# Patient Record
Sex: MALE | Race: ASIAN | URBAN - METROPOLITAN AREA
[De-identification: names, ages, dates, MRNs, and addresses within clinical notes are randomized per-mention and may not be internally consistent; named-entity substitution may affect disease eponyms.]

---

## 2020-07-06 ENCOUNTER — OFFICE VISIT (OUTPATIENT)
Dept: URBAN - METROPOLITAN AREA CLINIC 54 | Facility: CLINIC | Age: 30
End: 2020-07-06
Payer: COMMERCIAL

## 2020-07-06 DIAGNOSIS — K92.1 HEMATOCHEZIA: ICD-10-CM

## 2020-07-06 PROCEDURE — G8427 DOCREV CUR MEDS BY ELIG CLIN: HCPCS | Performed by: INTERNAL MEDICINE

## 2020-07-06 PROCEDURE — G9903 PT SCRN TBCO ID AS NON USER: HCPCS | Performed by: INTERNAL MEDICINE

## 2020-07-06 PROCEDURE — 1036F TOBACCO NON-USER: CPT | Performed by: INTERNAL MEDICINE

## 2020-07-06 PROCEDURE — 99202 OFFICE O/P NEW SF 15 MIN: CPT | Performed by: INTERNAL MEDICINE

## 2020-07-06 RX ORDER — POLYETHYLENE GLYCOL 3350, SODIUM SULFATE, SODIUM CHLORIDE, POTASSIUM CHLORIDE, ASCORBIC ACID, SODIUM ASCORBATE 140-9-5.2G
AS DIRECTED KIT ORAL
Qty: 1 | OUTPATIENT
Start: 2020-07-06 | End: 2020-07-07

## 2020-07-06 NOTE — HPI-OTHER HISTORIES
>>06/2020 ER visit 30 yo male presents to the ED after he squatted to  a package outside his door and when he got up noticed there was a moderate amount of blood on the floor where he had squatted. The incident occurred prior to arrival and pt decided to come to the ED. Pt also had an episode of bright red blood after having a bowel movement yesterday. Pt was in route to the ED and started feeling dizzy, had heart racing, and difficulty breathing while he was driving. Pt states his vision went black for a few seconds but was conscious. Pt pulled over and called 911. EMS arrived and checked pts vitals which were stable and they followed him to the ED. Denies fever, cough, chest pain,abdominal pain, n/v/d, and syncope.

## 2020-07-06 NOTE — HPI-TODAY'S VISIT:
This is a 29-year-old Chinese male referred by the [facility].  He has had intermittent episodes of bright red rectal bleeding in the toilet as well as on the tissue.  He has a history of hemorrhoids treated in [place] about 10 years ago.  And he has had had an endoscopic examination at that time of unknown extent.  He denies chronic constipation or diarrhea.  There is no abdominal pain.  He is has not experienced weight loss or other constitutional symptoms.  He does not have a family history of inflammatory bowel disease or GI cancer. Good general health with no cardiac or respiratory problems.  He works as an .

## 2020-07-07 ENCOUNTER — TELEPHONE ENCOUNTER (OUTPATIENT)
Dept: URBAN - METROPOLITAN AREA CLINIC 92 | Facility: CLINIC | Age: 30
End: 2020-07-07

## 2020-07-16 ENCOUNTER — OFFICE VISIT (OUTPATIENT)
Dept: URBAN - METROPOLITAN AREA SURGERY CENTER 14 | Facility: SURGERY CENTER | Age: 30
End: 2020-07-16

## 2020-07-24 ENCOUNTER — TELEPHONE ENCOUNTER (OUTPATIENT)
Dept: URBAN - METROPOLITAN AREA CLINIC 54 | Facility: CLINIC | Age: 30
End: 2020-07-24

## 2020-07-27 ENCOUNTER — TELEPHONE ENCOUNTER (OUTPATIENT)
Dept: URBAN - METROPOLITAN AREA CLINIC 54 | Facility: CLINIC | Age: 30
End: 2020-07-27

## 2020-07-27 ENCOUNTER — OFFICE VISIT (OUTPATIENT)
Dept: URBAN - METROPOLITAN AREA SURGERY CENTER 14 | Facility: SURGERY CENTER | Age: 30
End: 2020-07-27
Payer: COMMERCIAL

## 2020-07-27 DIAGNOSIS — K63.89 BACTERIAL OVERGROWTH SYNDROME: ICD-10-CM

## 2020-07-27 DIAGNOSIS — K92.1 BLACK STOOL: ICD-10-CM

## 2020-07-27 PROCEDURE — 45378 DIAGNOSTIC COLONOSCOPY: CPT | Performed by: INTERNAL MEDICINE

## 2020-07-27 PROCEDURE — G9937 DIG OR SURV COLSCO: HCPCS | Performed by: INTERNAL MEDICINE

## 2020-07-27 PROCEDURE — G8907 PT DOC NO EVENTS ON DISCHARG: HCPCS | Performed by: INTERNAL MEDICINE

## 2023-08-10 ENCOUNTER — OFFICE VISIT (OUTPATIENT)
Dept: URBAN - NONMETROPOLITAN AREA CLINIC 4 | Facility: CLINIC | Age: 33
End: 2023-08-10
Payer: COMMERCIAL

## 2023-08-10 ENCOUNTER — LAB OUTSIDE AN ENCOUNTER (OUTPATIENT)
Dept: URBAN - NONMETROPOLITAN AREA CLINIC 4 | Facility: CLINIC | Age: 33
End: 2023-08-10

## 2023-08-10 ENCOUNTER — WEB ENCOUNTER (OUTPATIENT)
Dept: URBAN - NONMETROPOLITAN AREA CLINIC 4 | Facility: CLINIC | Age: 33
End: 2023-08-10

## 2023-08-10 VITALS
TEMPERATURE: 97.4 F | HEART RATE: 71 BPM | DIASTOLIC BLOOD PRESSURE: 67 MMHG | SYSTOLIC BLOOD PRESSURE: 115 MMHG | HEIGHT: 72 IN | BODY MASS INDEX: 31.56 KG/M2 | WEIGHT: 233 LBS

## 2023-08-10 DIAGNOSIS — K21.9 GERD WITHOUT ESOPHAGITIS: ICD-10-CM

## 2023-08-10 DIAGNOSIS — R10.12 LEFT UPPER QUADRANT ABDOMINAL PAIN: ICD-10-CM

## 2023-08-10 DIAGNOSIS — K64.8 INTERNAL HEMORRHOID, BLEEDING: ICD-10-CM

## 2023-08-10 PROBLEM — 266435005: Status: ACTIVE | Noted: 2023-08-10

## 2023-08-10 PROCEDURE — 99204 OFFICE O/P NEW MOD 45 MIN: CPT | Performed by: INTERNAL MEDICINE

## 2023-08-10 PROCEDURE — 99214 OFFICE O/P EST MOD 30 MIN: CPT | Performed by: INTERNAL MEDICINE

## 2023-08-10 RX ORDER — OMEPRAZOLE 20 MG/1
1 CAPSULE 30 MINUTES BEFORE MORNING MEAL CAPSULE, DELAYED RELEASE ORAL ONCE A DAY
Status: ACTIVE | COMMUNITY

## 2023-08-10 RX ORDER — HYDROCORTISONE 25 MG/G
1 APPLICATION CREAM TOPICAL TWICE A DAY
Qty: 1 KIT | Refills: 0 | OUTPATIENT
Start: 2023-08-10

## 2023-08-10 NOTE — HPI-TODAY'S VISIT:
Pt reports that he has been having heartburn x 2 years onset.  Pt reports that he has been using the omeprazole for the past 1 year with some episodes of holding the medication.   Pt reports that when he stops he will have pain in the left upper quadrant, worse at night. Pt repoerts that despite taking the omeprazole he still has breakthrough symptoms. Has to take tums to get rid of the acid to improve.   Pt reports that he does not have nausea with sx.  Pt reports that he does not have fhx of gi neoplasm. Denies melena. Denies dysphagia.  Pt reports that sweet food- such as donuts- make worse.  Pt reports hat he notes that if he doesnt eat he feels worse and if does then sometimes.  Pt rp that he had upper endo in [place] in 2010 and 2016 - neg.   Had c scope with Dr. Jones for hematochezia which he still experiences periodically  Referred by Dr Gael Hamilton for eval and treatment. A copy will be sent

## 2023-08-17 ENCOUNTER — CLAIMS CREATED FROM THE CLAIM WINDOW (OUTPATIENT)
Dept: URBAN - NONMETROPOLITAN AREA CLINIC 4 | Facility: CLINIC | Age: 33
End: 2023-08-17
Payer: COMMERCIAL

## 2023-08-17 VITALS
HEART RATE: 69 BPM | TEMPERATURE: 97.2 F | WEIGHT: 229 LBS | HEIGHT: 72 IN | SYSTOLIC BLOOD PRESSURE: 114 MMHG | BODY MASS INDEX: 31.02 KG/M2 | DIASTOLIC BLOOD PRESSURE: 75 MMHG

## 2023-08-17 DIAGNOSIS — K64.8 INTERNAL HEMORRHOID, BLEEDING: ICD-10-CM

## 2023-08-17 DIAGNOSIS — K64.1 BLEEDING GRADE II HEMORRHOIDS: ICD-10-CM

## 2023-08-17 DIAGNOSIS — R10.12 LEFT UPPER QUADRANT ABDOMINAL PAIN: ICD-10-CM

## 2023-08-17 PROCEDURE — 25 SEPARATE E/M: Performed by: INTERNAL MEDICINE

## 2023-08-17 PROCEDURE — 46221 LIGATION OF HEMORRHOID(S): CPT | Performed by: INTERNAL MEDICINE

## 2023-08-17 RX ORDER — OMEPRAZOLE 20 MG/1
1 CAPSULE 30 MINUTES BEFORE MORNING MEAL CAPSULE, DELAYED RELEASE ORAL ONCE A DAY
Status: ACTIVE | COMMUNITY

## 2023-08-17 RX ORDER — HYDROCORTISONE 25 MG/G
1 APPLICATION CREAM TOPICAL TWICE A DAY
Qty: 1 KIT | Refills: 0 | Status: ACTIVE | COMMUNITY
Start: 2023-08-10

## 2023-08-17 NOTE — HPI-TODAY'S VISIT:
Pt reports that he has been having heartburn x 2 years onset.  Pt reports that he has been using the omeprazole for the past 1 year with some episodes of holding the medication.   Pt reports that when he stops he will have pain in the left upper quadrant, worse at night. Pt repoerts that despite taking the omeprazole he still has breakthrough symptoms. Has to take tums to get rid of the acid to improve.   Pt reports that he does not have nausea with sx.  Pt reports that he does not have fhx of gi neoplasm. Denies melena. Denies dysphagia.  Pt reports that sweet food- such as donuts- make worse.  Pt reports hat he notes that if he doesnt eat he feels worse and if does then sometimes.  Pt rp that he had upper endo in [place] in 2010 and 2016 - neg.   Had c scope with Dr. Jones for hematochezia which he still experiences periodically  8-17-23 Pt here for hemorrhoidal banding. Itching burning and bleeding from hemorrhoids

## 2023-09-06 ENCOUNTER — OFFICE VISIT (OUTPATIENT)
Dept: URBAN - METROPOLITAN AREA SURGERY CENTER 14 | Facility: SURGERY CENTER | Age: 33
End: 2023-09-06

## 2023-09-18 ENCOUNTER — OFFICE VISIT (OUTPATIENT)
Dept: URBAN - NONMETROPOLITAN AREA CLINIC 4 | Facility: CLINIC | Age: 33
End: 2023-09-18

## 2023-09-26 ENCOUNTER — CLAIMS CREATED FROM THE CLAIM WINDOW (OUTPATIENT)
Dept: URBAN - METROPOLITAN AREA CLINIC 4 | Facility: CLINIC | Age: 33
End: 2023-09-26
Payer: COMMERCIAL

## 2023-09-26 ENCOUNTER — TELEPHONE ENCOUNTER (OUTPATIENT)
Dept: URBAN - METROPOLITAN AREA CLINIC 54 | Facility: CLINIC | Age: 33
End: 2023-09-26

## 2023-09-26 ENCOUNTER — OUT OF OFFICE VISIT (OUTPATIENT)
Dept: URBAN - METROPOLITAN AREA SURGERY CENTER 14 | Facility: SURGERY CENTER | Age: 33
End: 2023-09-26
Payer: COMMERCIAL

## 2023-09-26 ENCOUNTER — OFFICE VISIT (OUTPATIENT)
Dept: URBAN - NONMETROPOLITAN AREA CLINIC 4 | Facility: CLINIC | Age: 33
End: 2023-09-26

## 2023-09-26 DIAGNOSIS — R12 BURNING REFLUX: ICD-10-CM

## 2023-09-26 DIAGNOSIS — K31.A0 GASTRIC INTESTINAL METAPLASIA, UNSPECIFIED: ICD-10-CM

## 2023-09-26 DIAGNOSIS — K29.50 ANTRAL GASTRITIS: ICD-10-CM

## 2023-09-26 DIAGNOSIS — K29.70 ERYTHEMATOUS GASTROPATHY: ICD-10-CM

## 2023-09-26 DIAGNOSIS — K21.9 GERD WITHOUT ESOPHAGITIS: ICD-10-CM

## 2023-09-26 PROCEDURE — 88342 IMHCHEM/IMCYTCHM 1ST ANTB: CPT | Performed by: PATHOLOGY

## 2023-09-26 PROCEDURE — 43239 EGD BIOPSY SINGLE/MULTIPLE: CPT | Performed by: INTERNAL MEDICINE

## 2023-09-26 PROCEDURE — G8907 PT DOC NO EVENTS ON DISCHARG: HCPCS | Performed by: INTERNAL MEDICINE

## 2023-09-26 PROCEDURE — 00731 ANES UPR GI NDSC PX NOS: CPT | Performed by: NURSE ANESTHETIST, CERTIFIED REGISTERED

## 2023-09-26 PROCEDURE — 88305 TISSUE EXAM BY PATHOLOGIST: CPT | Performed by: PATHOLOGY

## 2023-09-26 RX ORDER — OMEPRAZOLE 20 MG/1
1 CAPSULE 30 MINUTES BEFORE MORNING MEAL CAPSULE, DELAYED RELEASE ORAL ONCE A DAY
Status: ACTIVE | COMMUNITY

## 2023-09-26 RX ORDER — OMEPRAZOLE 20 MG/1
1 CAPSULE 30 MINUTES BEFORE MORNING MEAL CAPSULE, DELAYED RELEASE ORAL ONCE A DAY
Qty: 30 | Refills: 0

## 2023-09-26 RX ORDER — HYDROCORTISONE 25 MG/G
1 APPLICATION CREAM TOPICAL TWICE A DAY
Qty: 1 KIT | Refills: 0 | Status: ACTIVE | COMMUNITY
Start: 2023-08-10

## 2023-09-27 ENCOUNTER — OFFICE VISIT (OUTPATIENT)
Dept: URBAN - NONMETROPOLITAN AREA CLINIC 4 | Facility: CLINIC | Age: 33
End: 2023-09-27

## 2023-09-29 ENCOUNTER — OFFICE VISIT (OUTPATIENT)
Dept: URBAN - NONMETROPOLITAN AREA CLINIC 4 | Facility: CLINIC | Age: 33
End: 2023-09-29
Payer: COMMERCIAL

## 2023-09-29 VITALS
HEART RATE: 69 BPM | HEIGHT: 72 IN | SYSTOLIC BLOOD PRESSURE: 145 MMHG | DIASTOLIC BLOOD PRESSURE: 100 MMHG | WEIGHT: 226.4 LBS | BODY MASS INDEX: 30.66 KG/M2 | TEMPERATURE: 97.7 F

## 2023-09-29 DIAGNOSIS — K64.1 BLEEDING GRADE II HEMORRHOIDS: ICD-10-CM

## 2023-09-29 DIAGNOSIS — K64.8 INTERNAL HEMORRHOID, BLEEDING: ICD-10-CM

## 2023-09-29 DIAGNOSIS — R10.12 LEFT UPPER QUADRANT ABDOMINAL PAIN: ICD-10-CM

## 2023-09-29 PROCEDURE — 99214 OFFICE O/P EST MOD 30 MIN: CPT | Performed by: PHYSICIAN ASSISTANT

## 2023-09-29 RX ORDER — HYDROCORTISONE 25 MG/G
1 APPLICATION CREAM TOPICAL TWICE A DAY
Qty: 1 KIT | Refills: 0 | Status: ACTIVE | COMMUNITY
Start: 2023-08-10

## 2023-09-29 RX ORDER — OMEPRAZOLE 20 MG/1
1 CAPSULE 30 MINUTES BEFORE MORNING MEAL CAPSULE, DELAYED RELEASE ORAL ONCE A DAY
Status: ACTIVE | COMMUNITY

## 2023-09-29 RX ORDER — HYDROCORTISONE ACETATE 25 MG/1
1 SUPPOSITORY SUPPOSITORY RECTAL ONCE A DAY
Qty: 30 | OUTPATIENT
Start: 2023-09-29 | End: 2023-10-29

## 2023-09-29 NOTE — HPI-TODAY'S VISIT:
Pt reports that he has been having heartburn x 2 years onset.  Pt reports that he has been using the omeprazole for the past 1 year with some episodes of holding the medication.   Pt reports that when he stops he will have pain in the left upper quadrant, worse at night. Pt repoerts that despite taking the omeprazole he still has breakthrough symptoms. Has to take tums to get rid of the acid to improve.   Pt reports that he does not have nausea with sx.  Pt reports that he does not have fhx of gi neoplasm. Denies melena. Denies dysphagia.  Pt reports that sweet food- such as donuts- make worse.  Pt reports hat he notes that if he doesnt eat he feels worse and if does then sometimes.  Pt rp that he had upper endo in [place] in 2010 and 2016 - neg.   Had c scope with Dr. Jones for hematochezia which he still experiences periodically  8-17-23 Pt here for hemorrhoidal banding. Itching burning and bleeding from hemorrhoids  9.29.23 Still complaining of bleeding, last episode 1 week ago.  Also complains of rectal irritation and with pain with defacation

## 2023-10-27 ENCOUNTER — DASHBOARD ENCOUNTERS (OUTPATIENT)
Age: 33
End: 2023-10-27

## 2023-10-27 ENCOUNTER — OFFICE VISIT (OUTPATIENT)
Dept: URBAN - NONMETROPOLITAN AREA CLINIC 4 | Facility: CLINIC | Age: 33
End: 2023-10-27
Payer: COMMERCIAL

## 2023-10-27 VITALS
WEIGHT: 223.3 LBS | DIASTOLIC BLOOD PRESSURE: 70 MMHG | HEIGHT: 72 IN | SYSTOLIC BLOOD PRESSURE: 127 MMHG | TEMPERATURE: 97.7 F | BODY MASS INDEX: 30.25 KG/M2 | HEART RATE: 69 BPM

## 2023-10-27 DIAGNOSIS — A04.8 H. PYLORI INFECTION: ICD-10-CM

## 2023-10-27 DIAGNOSIS — R10.12 LEFT UPPER QUADRANT ABDOMINAL PAIN: ICD-10-CM

## 2023-10-27 DIAGNOSIS — K64.8 INTERNAL HEMORRHOID, BLEEDING: ICD-10-CM

## 2023-10-27 PROCEDURE — 99214 OFFICE O/P EST MOD 30 MIN: CPT | Performed by: PHYSICIAN ASSISTANT

## 2023-10-27 RX ORDER — HYDROCORTISONE ACETATE 25 MG/1
1 SUPPOSITORY SUPPOSITORY RECTAL ONCE A DAY
Qty: 30 | Status: ACTIVE | COMMUNITY
Start: 2023-09-29 | End: 2023-10-29

## 2023-10-27 RX ORDER — HYDROCORTISONE 25 MG/G
1 APPLICATION CREAM TOPICAL TWICE A DAY
Qty: 1 KIT | Refills: 0 | Status: ACTIVE | COMMUNITY
Start: 2023-08-10

## 2023-10-27 RX ORDER — OMEPRAZOLE 20 MG/1
1 CAPSULE 30 MINUTES BEFORE MORNING MEAL CAPSULE, DELAYED RELEASE ORAL ONCE A DAY
Status: ACTIVE | COMMUNITY

## 2023-10-27 RX ORDER — SERTRALINE 25 MG/1
1 TABLET TABLET, FILM COATED ORAL ONCE A DAY
Status: ACTIVE | COMMUNITY

## 2023-10-27 RX ORDER — HYDROCORTISONE ACETATE 25 MG/1
1 SUPPOSITORY SUPPOSITORY RECTAL ONCE A DAY
Qty: 30 | OUTPATIENT

## 2023-10-27 NOTE — HPI-TODAY'S VISIT:
Pt reports that he has been having heartburn x 2 years onset.  Pt reports that he has been using the omeprazole for the past 1 year with some episodes of holding the medication.   Pt reports that when he stops he will have pain in the left upper quadrant, worse at night. Pt repoerts that despite taking the omeprazole he still has breakthrough symptoms. Has to take tums to get rid of the acid to improve.   Pt reports that he does not have nausea with sx.  Pt reports that he does not have fhx of gi neoplasm. Denies melena. Denies dysphagia.  Pt reports that sweet food- such as donuts- make worse.  Pt reports hat he notes that if he doesnt eat he feels worse and if does then sometimes.  Pt rp that he had upper endo in [place] in 2010 and 2016 - neg.   Had c scope with Dr. Jones for hematochezia which he still experiences periodically  8-17-23 Pt here for hemorrhoidal banding. Itching burning and bleeding from hemorrhoids  9.29.23 Still complaining of bleeding, last episode 1 week ago.  Also complains of rectal irritation and with pain with defacation  10.27.23 No further rectal bleeding, reviewed EGD and path Today states wife from [place] 1 year ago, with previous HP infection.  Breath test neg

## 2023-11-09 LAB — H PYLORI BREATH TEST: NOT DETECTED

## 2023-12-17 ENCOUNTER — WEB ENCOUNTER (OUTPATIENT)
Dept: RURAL CLINIC 6 | Facility: CLINIC | Age: 33
End: 2023-12-17

## 2023-12-17 RX ORDER — HYDROCORTISONE ACETATE 25 MG/1
1 SUPPOSITORY SUPPOSITORY RECTAL ONCE A DAY
Qty: 30
End: 2024-01-17